# Patient Record
Sex: MALE | Race: WHITE
[De-identification: names, ages, dates, MRNs, and addresses within clinical notes are randomized per-mention and may not be internally consistent; named-entity substitution may affect disease eponyms.]

---

## 2018-08-19 ENCOUNTER — HOSPITAL ENCOUNTER (EMERGENCY)
Dept: HOSPITAL 54 - ER | Age: 28
Discharge: HOME | End: 2018-08-19
Payer: SELF-PAY

## 2018-08-19 VITALS — BODY MASS INDEX: 22.49 KG/M2 | HEIGHT: 65 IN | WEIGHT: 135 LBS

## 2018-08-19 VITALS — DIASTOLIC BLOOD PRESSURE: 72 MMHG | SYSTOLIC BLOOD PRESSURE: 110 MMHG

## 2018-08-19 DIAGNOSIS — F99: ICD-10-CM

## 2018-08-19 DIAGNOSIS — F41.9: ICD-10-CM

## 2018-08-19 DIAGNOSIS — F20.9: ICD-10-CM

## 2018-08-19 DIAGNOSIS — R09.1: Primary | ICD-10-CM

## 2018-08-19 LAB
APTT PPP: 29 SEC (ref 23–34)
BASOPHILS # BLD AUTO: 0 /CMM (ref 0–0.2)
BASOPHILS NFR BLD AUTO: 0.2 % (ref 0–2)
BUN SERPL-MCNC: 18 MG/DL (ref 7–18)
CALCIUM SERPL-MCNC: 8.7 MG/DL (ref 8.5–10.1)
CHLORIDE SERPL-SCNC: 104 MMOL/L (ref 98–107)
CO2 SERPL-SCNC: 28 MMOL/L (ref 21–32)
CREAT SERPL-MCNC: 1 MG/DL (ref 0.6–1.3)
EOSINOPHIL NFR BLD AUTO: 3.7 % (ref 0–6)
GLUCOSE SERPL-MCNC: 104 MG/DL (ref 74–106)
HCT VFR BLD AUTO: 39 % (ref 39–51)
HGB BLD-MCNC: 13.1 G/DL (ref 13.5–17.5)
INR PPP: 0.92 (ref 0.87–1.13)
LYMPHOCYTES NFR BLD AUTO: 1.4 /CMM (ref 0.8–4.8)
LYMPHOCYTES NFR BLD AUTO: 18.5 % (ref 20–44)
MCH RBC QN AUTO: 29 PG (ref 26–33)
MCHC RBC AUTO-ENTMCNC: 34 G/DL (ref 31–36)
MCV RBC AUTO: 87 FL (ref 80–96)
MONOCYTES NFR BLD AUTO: 0.7 /CMM (ref 0.1–1.3)
MONOCYTES NFR BLD AUTO: 9.3 % (ref 2–12)
NEUTROPHILS # BLD AUTO: 5.2 /CMM (ref 1.8–8.9)
NEUTROPHILS NFR BLD AUTO: 68.3 % (ref 43–81)
PLATELET # BLD AUTO: 168 /CMM (ref 150–450)
POTASSIUM SERPL-SCNC: 4.2 MMOL/L (ref 3.5–5.1)
RBC # BLD AUTO: 4.49 MIL/UL (ref 4.5–6)
RDW COEFFICIENT OF VARIATION: 13.9 (ref 11.5–15)
SODIUM SERPL-SCNC: 139 MMOL/L (ref 136–145)
TROPONIN I SERPL-MCNC: < 0.017 NG/ML (ref 0–0.06)
WBC NRBC COR # BLD AUTO: 7.7 K/UL (ref 4.3–11)

## 2018-08-19 PROCEDURE — A4606 OXYGEN PROBE USED W OXIMETER: HCPCS

## 2018-08-19 PROCEDURE — Z7610: HCPCS

## 2018-08-19 NOTE — NUR
Patient discharged to home in stable condition. Written and verbal after care 
instructions given. Patient verbalizes understanding of instruction.  
ambulatory with a steady gait. 



IV removed. Catheter intact and site benign. Pressure and 4x4 applied to site. 
No bleeding noted.



STAFF PER CRIS IRIZARRY AWARE PT TO BE TRANSPORTED BACK TO FACILITY VIA TAXI

## 2018-08-19 NOTE — NUR
BIBRA 86 FROM [facility]  C/O LEFT SIDED CHEST PAIN X 1 HR PTA. PT AOX3 RR 
EVEN AND UNLABORED. NO SOB NOTED. NAD NOTED. NO NVD AT THIS TIME. PT PLACED ON 
MONITOR. PT NOT DIAPHORETIC. PT APPEARS COMFORTABLE. WAITING FOR MD STROUD.